# Patient Record
Sex: FEMALE | Race: WHITE | URBAN - METROPOLITAN AREA
[De-identification: names, ages, dates, MRNs, and addresses within clinical notes are randomized per-mention and may not be internally consistent; named-entity substitution may affect disease eponyms.]

---

## 2018-05-13 ENCOUNTER — APPOINTMENT (OUTPATIENT)
Dept: GENERAL RADIOLOGY | Facility: CLINIC | Age: 33
End: 2018-05-13
Attending: PHYSICIAN ASSISTANT
Payer: COMMERCIAL

## 2018-05-13 ENCOUNTER — HOSPITAL ENCOUNTER (EMERGENCY)
Facility: CLINIC | Age: 33
Discharge: HOME | End: 2018-05-13
Attending: NURSE PRACTITIONER | Admitting: NURSE PRACTITIONER
Payer: COMMERCIAL

## 2018-05-13 VITALS
SYSTOLIC BLOOD PRESSURE: 166 MMHG | WEIGHT: 175 LBS | BODY MASS INDEX: 28.12 KG/M2 | OXYGEN SATURATION: 100 % | HEIGHT: 66 IN | HEART RATE: 85 BPM | TEMPERATURE: 98.1 F | DIASTOLIC BLOOD PRESSURE: 100 MMHG | RESPIRATION RATE: 18 BRPM

## 2018-05-13 DIAGNOSIS — T14.8XXA CRUSH INJURY: ICD-10-CM

## 2018-05-13 DIAGNOSIS — S69.92XA INJURY OF FINGER OF LEFT HAND, INITIAL ENCOUNTER: ICD-10-CM

## 2018-05-13 DIAGNOSIS — S62.639B OPEN FRACTURE OF TUFT OF DISTAL PHALANX OF FINGER: ICD-10-CM

## 2018-05-13 PROCEDURE — 25000132 ZZH RX MED GY IP 250 OP 250 PS 637: Performed by: PHYSICIAN ASSISTANT

## 2018-05-13 PROCEDURE — 73140 X-RAY EXAM OF FINGER(S): CPT | Mod: LT

## 2018-05-13 PROCEDURE — 99283 EMERGENCY DEPT VISIT LOW MDM: CPT | Mod: 25

## 2018-05-13 PROCEDURE — 11760 REPAIR OF NAIL BED: CPT

## 2018-05-13 RX ORDER — LORAZEPAM 1 MG/1
1 TABLET ORAL ONCE
Status: COMPLETED | OUTPATIENT
Start: 2018-05-13 | End: 2018-05-13

## 2018-05-13 RX ORDER — CEPHALEXIN 500 MG/1
500 CAPSULE ORAL 4 TIMES DAILY
Qty: 28 CAPSULE | Refills: 0 | Status: SHIPPED | OUTPATIENT
Start: 2018-05-13 | End: 2018-05-20

## 2018-05-13 RX ADMIN — LORAZEPAM 1 MG: 1 TABLET ORAL at 13:42

## 2018-05-13 ASSESSMENT — ENCOUNTER SYMPTOMS: WOUND: 1

## 2018-05-13 NOTE — ED AVS SNAPSHOT
Emergency Department    [address] [place] [address]    Phone:  [phone]    Fax:  [phone]                                       Luiza Medina   MRN: [id number]    Department:   Emergency Department   Date of Visit:  5/13/2018           After Visit Summary Signature Page     I have received my discharge instructions, and my questions have been answered. I have discussed any challenges I see with this plan with the nurse or doctor.    ..........................................................................................................................................  Patient/Patient Representative Signature      ..........................................................................................................................................  Patient Representative Print Name and Relationship to Patient    ..................................................               ................................................  Date                                            Time    ..........................................................................................................................................  Reviewed by Signature/Title    ...................................................              ..............................................  Date                                                            Time

## 2018-05-13 NOTE — ED PROVIDER NOTES
Emergency Department Attending Supervision Note  5/13/2018  1:44 PM      I evaluated this patient in conjunction with  Tanisha RAMOS      Briefly, the patient presented with left index finger crush injury.  She was moving a planter from a higher shelf to a lower area when she got her finger caught between the plantar and one below her.  Does appear to be a nail avulsion.  She does feel lightheaded.  She has no other concerns or complaints.  She is up-to-date with her immunizations.      On my exam:  General: Alert, Mild  discomfort, well kept, tearful  HENT:  Normal voice, No lymphadenopathy  Eyes:  The pupils are equal, round, and reactive to light, Conjunctiva normal, No scleral icterus   Neck:  Normal range of motion  CV:  Normal Pulses  Resp:  Non-labored, No cough  MS:  Normal muscular tone, moves all extremities, Left index finger with crush injury to the tip.  Nail plate is avulsed from matrix.  Otherwise normal circulation.  Skin:  No rash or acute skin lesions noted  Neuro: Speech is normal and fluent  Psych:  Awake. Alert.  Anxious appropriate interactions. Good eye contact    Recent Results (from the past 24 hour(s))   XR Finger Left G/E 2 Views    Narrative    FINGER(S) TWO-THREE VIEWS LEFT  5/13/2018 2:06 PM     HISTORY: crush injury to distal index finger;     COMPARISON: None.    FINDINGS: There is a fracture of the tuft of the distal phalanx of the  second finger. There is slight displacement of the fracture  fragments..      Impression    IMPRESSION: Tuft fracture.    LAW NEIL MD     Procedure:  Nail plate removal and nailbed repair.  Digital block with bupivacaine used.  Scrubbed with Shur-Clens.  Adequate anesthesia achieved.  Nailplate was removed using blunt dissection.  Nailbed was repaired using 5-0 Vicryl suture, approximately 6 sutures placed.  Nail plate was cleaned and trimmed then placed back into nail fold and anchored with #2 4-0 nylon sutures.    Crush injury with open tuft fracture.   Repaired as above.  Tetanus up-to-date.  Antibiotics prescribed.  No evidence of tendon injury.    Diagnosis    ICD-10-CM    1. Crush injury T14.8XXA    2. Injury of finger of left hand, initial encounter S69.92XA    3. Avulsion of nail plate, initial encounter S61.309A    4. Open fracture of tuft of distal phalanx of finger S62.639B          Michael BoykinsCNP 5/13/2018 1:44 PM       Michael Patterson, APRN CNP  05/13/18 1514

## 2018-05-13 NOTE — DISCHARGE INSTRUCTIONS
Detached Fingernail or Toenail  A detached nail is when the nail becomes  from the area underneath it (the nail bed). This often means losing all or part of the nail. An injury to your finger or toe is often the cause. It can also be caused by an infection around or under the nail.  Sometimes there is a cut in the nail bed or a bone fracture under the nail. In most cases, the nail will grow back from the area under the cuticle (the matrix). A fingernail takes about 4 to 6 months to grow back. A toenail takes about 12 months to grow back. If the nail bed or matrix was damaged, the nail may grow back with a rough or abnormal shape. In some cases the nail may not grow back at all.    There may be damage or a cut to the nail bed. This may need to be repaired. Often this is done with stitches. If the nail is still in good condition, it might be cleaned and trimmed, then put back in place. This is done to help and protect the new nail as it grows back. It also prevents the nail bed from drying out.  Home care    Keep the injured part raised to reduce pain and swelling. This is important, especially in the first 48 hours.    Apply an ice pack for up to 20 minutes. Do this every 3 to 6 hours during the first 24 to 48 hours. Keep using ice packs to ease pain and swelling as needed. To make an ice pack, put ice cubes in a plastic bag that seals at the top. Wrap the bag in a clean, thin towel or cloth. Never put ice or an ice pack directly on the skin. The ice pack can be put right on a wrap, cast, or splint. As the ice melts, be careful not to get any wrap, cast, or splint wet.    The nail bed is moist, soft, and sensitive. It needs to be protected from injury for the first 7 to 10 days until it dries out and becomes hard. Keep it covered with a nonstick dressing or a bandage without adhesive.    When a dressing is placed on an exposed nail bed, it may stick and be hard to remove if left in place more than 24  hours. Unless you were told otherwise, change dressings every 24 hours. If needed, soak the dressing off while holding it under warm running water. Then lightly pat the wound dry. Apply a layer of antibiotic ointment before putting on the new dressing or bandage. This will help keep it from sticking. Use a nonstick dressing with the antibiotic ointment under the outer dressing.    If an X-ray showed that you have a fracture, it will take about 4 weeks for this to heal. The injured part should be protected with a splint or tape while it is healing.    If you were given antibiotics to prevent infection, take them as directed until they are all gone.  Medicine    You can take over-the-counter medicine for pain, unless you were given a different pain medicine to use. Talk with your provider before using these medicines if you have chronic liver or kidney disease. Also talk with your provider if you ever had a stomach ulcer or GI bleeding, or are taking blood thinner medicines.    If you were given antibiotics, take them until they are done. It is important to finish the antibiotics even if the wound looks better. This is to make sure the infection has cleared.  Follow-up care  Follow up with your healthcare provider, or as advised. If X-rays were taken, you will be told of any new findings that may affect your care.  When to seek medical advice  Call your healthcare provider right away if any of these occur:    Pain or swelling increases    Redness around the nail    Pus (creamy white or yellow fluid) draining from the nail    Fever of 100.4 F (38 C) or higher, or as directed by your provider  Date Last Reviewed: 8/1/2016 2000-2017 [employer]. [address] [place] [address], [place] [address]. All rights reserved. This information is not intended as a substitute for professional medical care. Always follow your healthcare professional's instructions.

## 2018-05-13 NOTE — ED PROVIDER NOTES
"  History     Chief Complaint:  Hand injury    HPI   Luiza Medina is a right hand dominant 32 year old female who presents after a hand injury. Patient reports she was at [facility], she was trying to pick a plant pot from a shelf and smashed her left index finger onto another plant pot, and fingernail detached after the incident. Bleeding controlled and finger was wrapped up in gauze and ACE wrap at [facility]. Patient is currently experiencing a lot of pain in her finger. Patient states she feels lightheaded and like she's going to faint. Patient's last tetanus was 3/15/15.    Allergies:  No known drug allergies     Medications:    The patient is currently on no regular medications.     Past Medical History:    The patient is currently on no regular medications.     Past Surgical History:    History reviewed. No pertinent surgical history.     Family History:    History reviewed. No pertinent family history.      Social History:  Smoking status: Never smoker  Alcohol use: Yes   Marital Status:   [2].    Review of Systems   Musculoskeletal:        Finger pain   Skin: Positive for wound.   All other systems reviewed and are negative.    Physical Exam     Patient Vitals for the past 24 hrs:   BP Temp Temp src Pulse Resp SpO2 Height Weight   05/13/18 1535 - - - - 18 - - -   05/13/18 1327 (!) 166/100 98.1  F (36.7  C) Oral 85 22 100 % 1.676 m (5' 6\") 79.4 kg (175 lb)      Physical Exam  General: Alert, interactive. Hyperventilating and tearful.   Head:  Scalp is atraumatic.      Neck:  Normal range of motion. There is no rigidity.   CV:   2+ radial pulses  Resp:  Non-labored, no retractions or accessory muscle use.  MS:  Normal range of motion. Full ROM of IP joints of left index finger.   Skin:  Warm and dry. Left index finger: proximal nailbed loose from matrix and bleeding from underneath nail. With removal of nail, evidence of crush injury to the level of bone.   Neuro:  5/5 strength of all IP joints of " left index finger. sensation intact to distal index finger.   Psych:  Awake. Alert.  Appropriate interactions.     Emergency Department Course     Imaging:  Radiographic findings were communicated with the patient who voiced understanding of the findings.  XR Finger Left G/E 2 Views  Tuft fracture.  As read by Radiology.     Procedure:   PROCEDURE:  Digital Block  LOCATION:  Left index finger  ANESTHESIA: Digital block using 0.5% Bubivicaine, total of 2 mLs  PROCEDURE NOTE: . The patient tolerated the procedure well with moderate relief of discomfort and there were no complications.     Interventions:  1342: Ativan 1 mg oral     Emergency Department Course:  Past medical records, nursing notes, and vitals reviewed.  1329: I performed an exam of the patient and obtained history, as documented above.     1351: Digital block performed as detailed above.     1432: Supervising provider, Michael Patterson NP performed a laceration repair. Please see his note for details of the procedure.     Findings and plan explained to the patient. Patient discharged home, status improved, with instructions regarding supportive care, medications, and reasons to return as well as the importance of close follow-up was reviewed.      Impression & Plan      Medical Decision Making:  Luiza Medina is a right hand, 32 year old female who presents for evaluation of a complex finger laceration to the left index fingertip with nail involvement.  Xray reveals a tuft fracture. Tetanus status up to date. The fingernail was loose from matrix and was removed so the nailbed could be closed, this was performed by my supervising provider Michael Patterson NP, please see his note for further details. The nail was replaced and the wound was dressed.  Keflex prescribed for open fracture prophylaxis.  There is no signs at this point of tendon injury.  Sensation is intact distally in that finger. Patient will follow up with hand surgery per discharge  instructions, referral given.       Diagnosis:    ICD-10-CM    1. Crush injury T14.8XXA    2. Injury of finger of left hand, initial encounter S69.92XA    3. Avulsion of nail plate, initial encounter S61.309A    4. Open fracture of tuft of distal phalanx of finger S62.639B        Disposition:  discharged to home    Discharge Medications:  Discharge Medication List as of 5/13/2018  3:21 PM      START taking these medications    Details   cephALEXin (KEFLEX) 500 MG capsule Take 1 capsule (500 mg) by mouth 4 times daily for 7 days, Disp-28 capsule, R-0, Local Print           [facility]  5/13/2018    EMERGENCY DEPARTMENT  I, [facility], am serving as a scribe at 1:29 PM on 5/13/2018 to document services personally performed by Tanisha Patel PA-C based on my observations and the provider's statements to me.       Tanisha Patel PA-C  05/13/18 1600

## 2018-05-13 NOTE — ED AVS SNAPSHOT
Emergency Department    [address] [facility] [address]    Phone:  [phone]    Fax:  [phone]                                       Luiza Medina   MRN: [id number]    Department:   Emergency Department   Date of Visit:  5/13/2018           Patient Information     Date Of Birth          1985        Your diagnoses for this visit were:     Crush injury     Injury of finger of left hand, initial encounter     Avulsion of nail plate, initial encounter     Open fracture of tuft of distal phalanx of finger        You were seen by Michael Patterson, NITZA CNP.      Follow-up Information     Call Orthopaedics, [facility].    Why:  tomorrow to schedule appointment for follow up later this week    Contact information:    [address] [address]  [phone]          Discharge Instructions         Detached Fingernail or Toenail  A detached nail is when the nail becomes  from the area underneath it (the nail bed). This often means losing all or part of the nail. An injury to your finger or toe is often the cause. It can also be caused by an infection around or under the nail.  Sometimes there is a cut in the nail bed or a bone fracture under the nail. In most cases, the nail will grow back from the area under the cuticle (the matrix). A fingernail takes about 4 to 6 months to grow back. A toenail takes about 12 months to grow back. If the nail bed or matrix was damaged, the nail may grow back with a rough or abnormal shape. In some cases the nail may not grow back at all.    There may be damage or a cut to the nail bed. This may need to be repaired. Often this is done with stitches. If the nail is still in good condition, it might be cleaned and trimmed, then put back in place. This is done to help and protect the new nail as it grows back. It also prevents the nail bed from drying out.  Home care    Keep the injured part raised to reduce pain and swelling. This  is important, especially in the first 48 hours.    Apply an ice pack for up to 20 minutes. Do this every 3 to 6 hours during the first 24 to 48 hours. Keep using ice packs to ease pain and swelling as needed. To make an ice pack, put ice cubes in a plastic bag that seals at the top. Wrap the bag in a clean, thin towel or cloth. Never put ice or an ice pack directly on the skin. The ice pack can be put right on a wrap, cast, or splint. As the ice melts, be careful not to get any wrap, cast, or splint wet.    The nail bed is moist, soft, and sensitive. It needs to be protected from injury for the first 7 to 10 days until it dries out and becomes hard. Keep it covered with a nonstick dressing or a bandage without adhesive.    When a dressing is placed on an exposed nail bed, it may stick and be hard to remove if left in place more than 24 hours. Unless you were told otherwise, change dressings every 24 hours. If needed, soak the dressing off while holding it under warm running water. Then lightly pat the wound dry. Apply a layer of antibiotic ointment before putting on the new dressing or bandage. This will help keep it from sticking. Use a nonstick dressing with the antibiotic ointment under the outer dressing.    If an X-ray showed that you have a fracture, it will take about 4 weeks for this to heal. The injured part should be protected with a splint or tape while it is healing.    If you were given antibiotics to prevent infection, take them as directed until they are all gone.  Medicine    You can take over-the-counter medicine for pain, unless you were given a different pain medicine to use. Talk with your provider before using these medicines if you have chronic liver or kidney disease. Also talk with your provider if you ever had a stomach ulcer or GI bleeding, or are taking blood thinner medicines.    If you were given antibiotics, take them until they are done. It is important to finish the antibiotics even  if the wound looks better. This is to make sure the infection has cleared.  Follow-up care  Follow up with your healthcare provider, or as advised. If X-rays were taken, you will be told of any new findings that may affect your care.  When to seek medical advice  Call your healthcare provider right away if any of these occur:    Pain or swelling increases    Redness around the nail    Pus (creamy white or yellow fluid) draining from the nail    Fever of 100.4 F (38 C) or higher, or as directed by your provider  Date Last Reviewed: 8/1/2016 2000-2017 [employer]. [address] [place] [address]. All rights reserved. This information is not intended as a substitute for professional medical care. Always follow your healthcare professional's instructions.          Discharge References/Attachments     LACERATION, ALL (ENGLISH)    CRUSH INJURY, HAND (ENGLISH)      24 Hour Appointment Hotline       To make an appointment at any [facility], [place], call [phone] ([phone]). If you don't have a family doctor or clinic, we will help you find one. [facility] clinics are conveniently located to serve the needs of you and your family.             Review of your medicines      START taking        Dose / Directions Last dose taken    cephALEXin 500 MG capsule   Commonly known as:  KEFLEX   Dose:  500 mg   Quantity:  28 capsule        Take 1 capsule (500 mg) by mouth 4 times daily for 7 days   Refills:  0                Prescriptions were sent or printed at these locations (1 Prescription)                   Other Prescriptions                Printed at Department/Unit printer (1 of 1)         cephALEXin (KEFLEX) 500 MG capsule                Procedures and tests performed during your visit     XR Finger Left G/E 2 Views      Orders Needing Specimen Collection     None      Pending Results     No orders found from 5/11/2018 to 5/14/2018.            Pending Culture Results     No orders found from  5/11/2018 to 5/14/2018.            Pending Results Instructions     If you had any lab results that were not finalized at the time of your Discharge, you can call the ED Lab Result RN at [phone]. You will be contacted by this team for any positive Lab results or changes in treatment. The nurses are available 7 days a week from 10A to 6:30P.  You can leave a message 24 hours per day and they will return your call.        Test Results From Your Hospital Stay        5/13/2018  2:19 PM      Narrative     FINGER(S) TWO-THREE VIEWS LEFT  5/13/2018 2:06 PM     HISTORY: crush injury to distal index finger;     COMPARISON: None.    FINDINGS: There is a fracture of the tuft of the distal phalanx of the  second finger. There is slight displacement of the fracture  fragments..        Impression     IMPRESSION: Tuft fracture.    LAW NEIL MD                Clinical Quality Measure: Blood Pressure Screening     Your blood pressure was checked while you were in the emergency department today. The last reading we obtained was  BP: (!) 166/100 . Please read the guidelines below about what these numbers mean and what you should do about them.  If your systolic blood pressure (the top number) is less than 120 and your diastolic blood pressure (the bottom number) is less than 80, then your blood pressure is normal. There is nothing more that you need to do about it.  If your systolic blood pressure (the top number) is 120-139 or your diastolic blood pressure (the bottom number) is 80-89, your blood pressure may be higher than it should be. You should have your blood pressure rechecked within a year by a primary care provider.  If your systolic blood pressure (the top number) is 140 or greater or your diastolic blood pressure (the bottom number) is 90 or greater, you may have high blood pressure. High blood pressure is treatable, but if left untreated over time it can put you at risk for heart attack, stroke, or kidney failure.  "You should have your blood pressure rechecked by a primary care provider within the next 4 weeks.  If your provider in the emergency department today gave you specific instructions to follow-up with your doctor or provider even sooner than that, you should follow that instruction and not wait for up to 4 weeks for your follow-up visit.        Thank you for choosing [facility]       Thank you for choosing [facility] for your care. Our goal is always to provide you with excellent care. Hearing back from our patients is one way we can continue to improve our services. Please take a few minutes to complete the written survey that you may receive in the mail after you visit with us. Thank you!        [employer] Information     [employer] lets you send messages to your doctor, view your test results, renew your prescriptions, schedule appointments and more. To sign up, go to [email or web address] . Click on \"Log in\" on the left side of the screen, which will take you to the Welcome page. Then click on \"Sign up Now\" on the right side of the page.     You will be asked to enter the access code listed below, as well as some personal information. Please follow the directions to create your username and password.     Your access code is: [email or web address]  Expires: 2018  3:21 PM     Your access code will  in 90 days. If you need help or a new code, please call your [facility] clinic or [phone].        Care EveryWhere ID     This is your Care EveryWhere ID. This could be used by other organizations to access your [facility] medical records  [id number]        Equal Access to Services     [facility] NILAY : Hadii markel mcintosh hadasho Solyssaali, waaxda luqadaha, qaybta kaalmada adeegyada, soila reyes. So [place] [phone].    ATENCIÓN: Si habla español, tiene a ahn disposición servicios gratuitos de asistencia lingüística. Llame al [phone].    We comply with applicable federal civil rights laws and " [place] laws. We do not discriminate on the basis of race, color, national origin, age, disability, sex, sexual orientation, or gender identity.            After Visit Summary       This is your record. Keep this with you and show to your community pharmacist(s) and doctor(s) at your next visit.

## 2019-10-25 ENCOUNTER — HOSPITAL PATHOLOGY (OUTPATIENT)
Dept: OTHER | Facility: CLINIC | Age: 34
End: 2019-10-25

## 2019-10-28 LAB — COPATH REPORT: NORMAL

## 2019-11-26 LAB — COPATH REPORT: NORMAL

## 2023-03-10 DIAGNOSIS — I10 HYPERTENSION, UNSPECIFIED TYPE: Primary | ICD-10-CM

## 2023-03-10 RX ORDER — LABETALOL 100 MG/1
TABLET, FILM COATED ORAL
Qty: 180 TABLET | Refills: 3 | Status: SHIPPED | OUTPATIENT
Start: 2023-03-10

## 2023-05-14 DIAGNOSIS — F41.9 ANXIETY: Primary | ICD-10-CM

## 2023-05-15 RX ORDER — SERTRALINE HYDROCHLORIDE 100 MG/1
TABLET, FILM COATED ORAL
Qty: 90 TABLET | Refills: 2 | Status: SHIPPED | OUTPATIENT
Start: 2023-05-15 | End: 2024-01-31

## 2024-01-31 DIAGNOSIS — F41.9 ANXIETY: ICD-10-CM

## 2024-01-31 RX ORDER — SERTRALINE HYDROCHLORIDE 100 MG/1
TABLET, FILM COATED ORAL
Qty: 90 TABLET | Refills: 0 | Status: SHIPPED | OUTPATIENT
Start: 2024-01-31

## 2024-04-26 DIAGNOSIS — F41.9 ANXIETY: ICD-10-CM

## 2024-04-29 RX ORDER — SERTRALINE HYDROCHLORIDE 100 MG/1
100 TABLET, FILM COATED ORAL DAILY
Qty: 90 TABLET | Refills: 0 | OUTPATIENT
Start: 2024-04-29

## 2024-04-29 NOTE — TELEPHONE ENCOUNTER
Pt called back and advised she does [place] new doctor [place]. Pt will call mail order to update her new doctor. Will remove Dr. Kathleen's name from chart